# Patient Record
Sex: MALE | Race: BLACK OR AFRICAN AMERICAN | Employment: FULL TIME | URBAN - METROPOLITAN AREA
[De-identification: names, ages, dates, MRNs, and addresses within clinical notes are randomized per-mention and may not be internally consistent; named-entity substitution may affect disease eponyms.]

---

## 2021-02-22 ENCOUNTER — OFFICE VISIT (OUTPATIENT)
Dept: ORTHOPEDIC SURGERY | Age: 41
End: 2021-02-22
Payer: COMMERCIAL

## 2021-02-22 VITALS
WEIGHT: 188 LBS | HEIGHT: 71 IN | DIASTOLIC BLOOD PRESSURE: 71 MMHG | BODY MASS INDEX: 26.32 KG/M2 | SYSTOLIC BLOOD PRESSURE: 124 MMHG | TEMPERATURE: 97.1 F | RESPIRATION RATE: 15 BRPM | HEART RATE: 68 BPM

## 2021-02-22 DIAGNOSIS — S83.001A PATELLAR SUBLUXATION, RIGHT, INITIAL ENCOUNTER: Primary | ICD-10-CM

## 2021-02-22 PROCEDURE — 99204 OFFICE O/P NEW MOD 45 MIN: CPT | Performed by: FAMILY MEDICINE

## 2021-02-22 RX ORDER — MELOXICAM 15 MG/1
TABLET ORAL
Qty: 90 TAB | Refills: 0 | Status: SHIPPED | OUTPATIENT
Start: 2021-02-22 | End: 2021-04-19

## 2021-02-22 NOTE — PROGRESS NOTES
AVS reviewed: YES  HEP: Pt declined demo  Resources Provided: YES YouTube Videos  Patient questions/concerns answered: YES Had questions about putting on the brace  Patient verbalized understanding of treatment plan: YES

## 2021-02-22 NOTE — PATIENT INSTRUCTIONS
Patient to start taking medication as prescribed. Patient to start physical therapy . Do home exercise and use patellar brace daily. Return to the office in 5 weeks Search YouTube for my channel: 
 
Dr. Cheryll Osler I will be deploying to [place] with the [employer] from approximately April 9, 2021 through July 20, 2021. I will be available sporadically via My Chart or via relay of phone messages to me via the internet. Please reach out to us if there is anything we can do for you while I am away, and I look forward to seeing you again when I return in the summer. 
 
-Dr. Lc White

## 2021-02-22 NOTE — PROGRESS NOTES
HISTORY OF PRESENT ILLNESS    Jasen Asif 1980 is a 38y.o. year old male comes in today as new patient for: right knee pain    Patients symptoms have been present for 3 weeks. Pain level 10 - Worst pain ever/10 anterior. It has worsened with walking. Patient has tried:  ER visit and XR but no Rx. Heat and ibuprofen minimal benefit. It is described as pain in knee w/o known injury. IMAGING: XR right knee 2/16/21  1.  Joint effusion. 2. Patient has patella ultrasound which can be associated with transient patellar dislocation. If clinical concern, consider MR for further evaluation. History reviewed. No pertinent surgical history. Social History     Socioeconomic History    Marital status: SINGLE     Spouse name: Not on file    Number of children: Not on file    Years of education: Not on file    Highest education level: Not on file   Tobacco Use    Smoking status: Current Some Day Smoker     Packs/day: 0.25    Smokeless tobacco: Never Used   Substance and Sexual Activity    Alcohol use: Yes     Comment: occasionally    Drug use: Never     Types: Marijuana, Heroin   Social History Narrative    ** Merged History Encounter **           Current Outpatient Medications   Medication Sig Dispense Refill    LOSARTAN PO Take  by mouth.  naloxone (Narcan) 4 mg/actuation nasal spray Use 1 spray intranasally, then discard. Repeat with new spray every 2 min as needed for opioid overdose symptoms, alternating nostrils. 2 Each 0     Past Medical History:   Diagnosis Date    Hypertension      History reviewed. No pertinent family history. ROS:  Some swell, no bruise, numb    Objective:  /71   Pulse 68   Temp 97.1 °F (36.2 °C)   Resp 15   Ht 5' 11\" (1.803 m)   Wt 188 lb (85.3 kg)   BMI 26.22 kg/m²   NEURO:  Sensation intact light touch B/L lower extremities. MS:  LE Strength +5/5 bilateral .   right Knee:  2+ Effusion . Lachman negative. Valgus negative. Varus negative. negative joint line tenderness medial, lateral.  Jamia negative. Posterior drawer negative. Noble compression test negative. Patellar apprehension positive. Patellar facet positive tender to palpation medial no crepitance bilateral .  Pes anserine negative TTP bilateral       Assessment/Plan:     ICD-10-CM ICD-9-CM    1. Patellar subluxation, right, initial encounter  S83.001A 836.3 meloxicam (MOBIC) 15 mg tablet      AMB SUPPLY ORDER      REFERRAL TO PHYSICAL THERAPY       Patient (or guardian if minor) verbalizes understanding of evaluation and plan. Will start mobic and begin PT w/ HEP and patellar stabilizing brace. RTC 5 weeks.

## 2021-03-05 ENCOUNTER — HOSPITAL ENCOUNTER (OUTPATIENT)
Dept: PHYSICAL THERAPY | Age: 41
Discharge: HOME | End: 2021-03-05
Payer: COMMERCIAL

## 2021-03-05 PROCEDURE — 97162 PT EVAL MOD COMPLEX 30 MIN: CPT

## 2021-03-05 NOTE — PROGRESS NOTES
PT DAILY TREATMENT NOTE/KNEE EVAL     Patient Name: Ubaldo Cons  Date:3/5/2021  : 1980  [x]  Patient  Verified  Payor: [address] / Plan: [address] / Product Type: Managed Care Medicaid /    In time:2:30P  Out time:3:00P  Total Treatment Time (min): 30  Visit #: 1 of 12    Treatment Area: Pain in right knee [M25.561]  Unspecified subluxation of right patella, initial encounter [S83.001A]    SUBJECTIVE  Pain Level (0-10 scale): 8.5  []constant []intermittent []improving [x]worsening []no change since onset    Any medication changes, allergies to medications, adverse drug reactions, diagnosis change, or new procedure performed?: [x] No    [] Yes (see summary sheet for update)  Subjective functional status/changes:     PLOF: Ind with ADL/IADLs, self care tasks and recreational participation  Limitations to PLOF: Increased Pain, Instability   Mechanism of Injury/ Current Symptoms: 5- 6 years ago knee twisted R LE (physically demonstrates extreme hip IR) upon stepping out of bathtub ;3 weeks ago just felt R leg give out him upon standing. MD prescribed brace for stability. Aggravating factors include ascending/decending stairs, walking extended distanes. Easing factors include wearing MD prescribed brace and \"staying off of it\". Describes symptoms as achy localized to R knee cap region. Denies numbness/tingling in R LE.     PMHx/Surgical Hx: High Blood Pressure  Work Hx: N/A secondary to COVID  Pt Goals: \"increase strength in leg\" \"play basketball again\"      OBJECTIVE/EXAMINATION    30 min [x]Eval                  []Re-Eval           With   [] TE   [] TA   [] neuro   [] other: Patient Education: [x] Review HEP    [] Progressed/Changed HEP based on:   [] positioning   [] body mechanics   [] transfers   [] heat/ice application    [] other:          Gait:  [x] Normal    [] Abnormal    [] Antalgic    [] NWB    Device: w/ brace    Describe:    ROM / Strength  [] Unable to assess         AROM(deg)                  Strength (1-5)    Left Right Left Right   Hip Flexion NT NT 5 4    Extension NT NT 5 4    Abduction NT NT 5 4-    IR/ER NT NT 5 NT   Knee Flexion 130 110 5 4-    Extension 0 0 5 4-   Ankle Plantarflexion NT NT 5 4+    Dorsiflexion NT NT 5 4+        Flexibility: [] Unable to assess at this time  Hamstrings:    (L) Tightness= [] WNL   [x] Min   [] Mod   [] Severe    (R) Tightness= [] WNL   [] Min   [x] Mod   [] Severe  Quadriceps:    (L) Tightness= [] WNL   [x] Min   [] Mod   [] Severe    (R) Tightness= [] WNL   [] Min   [x] Mod   [] Severe  Gastroc:      (L) Tightness= [] WNL   [x] Min   [] Mod   [] Severe    (R) Tightness= [] WNL   [] Min   [x] Mod   [] Severe  Other:    Palpation:   Neg/Pos  Neg/Pos  Neg/Pos   Joint Line Pos Quad tendon Pos Patellar ligament Pos   Patella Pos Fibular head Neg Pes Anserinus Neg   Tibial tubercle Neg Hamstring tendons Pos Infrapatellar fat pad NT     Optional Tests:  Patellar Positioning (Static)   []L [x]R Lateral  Patellar Tracking   []L [x]R Glide (Med)    Patellar Mobility   []L [x]R Hypermobile []L []R Hypomobile         Posterior Drawer [x] Neg    [] Pos   Yemi [x] Neg    [] Pos  Valgus@ 30 Degrees [x] Neg    [] Pos   Patellar Apprehension [] Neg    [x] Pos  Varus@ 30 Degrees [x] Neg    [] Pos     Anterior Drawer [x] Neg    [] Pos     SLS: NT           Pain Level (0-10 scale) post treatment: 8.5    ASSESSMENT/Changes in Function: See POC    Patient will continue to benefit from skilled PT services to modify and progress therapeutic interventions, address functional mobility deficits, address ROM deficits, address strength deficits, analyze and address soft tissue restrictions, analyze and cue movement patterns, analyze and modify body mechanics/ergonomics, assess and modify postural abnormalities and address imbalance/dizziness to attain remaining goals.      [x]  See Plan of Care  []  See progress note/recertification  []  See Discharge Summary         Progress towards goals / Updated goals:  See POC    PLAN  [x]  Upgrade activities as tolerated     [x]  Continue plan of care  []  Update interventions per flow sheet       []  Discharge due to:_  []  Other:_      Rizwana Quiñonez DPT 3/5/2021  1:06 PM

## 2021-03-05 NOTE — PROGRESS NOTES
[facility] [address], [address] [place] [address], [address] [phone] fax      Plan of Care/ Statement of Necessity for Physical Therapy Services    Patient name: Jasen Asif Start of Care: 3/5/2021   Referral source: Raymond Espinosa DO : 1980    Medical Diagnosis: Pain in right knee [M25.561]  Unspecified subluxation of right patella, initial encounter [S83.001A]  Payor: [address] [place], [place] [address] / Plan: [address] / Product Type: Managed Care Medicaid /  Onset Date: 3 wks ago    Treatment Diagnosis: Pain in Right Knee   Prior Hospitalization: see medical history Provider#: [id number]   Medications: Verified on Patient summary List    Comorbidities: High Blood Pressure    Prior Level of Function: Ind with ADL/IADLs, self care tasks, recreational participation and vocational responsibilities       The Plan of Care and following information is based on the information from the initial evaluation. Assessment/ key information:     Patient presents to clinic secondary to R knee pain following subluxation of R patella 3 wks ago. Clinical examination demonstrates soft tissue restrictions, joint hypermobility, decreased function (evident by FOTO score), decreased strength, AROM, stability, flexibility, and overall mobility limitations/compensatory movement patterns. These limitations affect the patient's ability to stand extended durations, ambulate extended distances, perform ADLs/IADLs, self care tasks and recreational participation efficiently and pain free. The patient would benefit from skilled physical therapy interventions to address the aforementioned impairments in order to return to his PLOF of completing all functional activities pain free and independently.      Evaluation Complexity History MEDIUM  Complexity : 1-2 comorbidities / personal factors will impact the outcome/ POC ; Examination MEDIUM Complexity : 3 Standardized tests and measures addressing body structure, function, activity limitation and / or participation in recreation  ;Presentation MEDIUM Complexity : Evolving with changing characteristics  ; Clinical Decision Making MEDIUM Complexity : FOTO score of 26-74  Overall Complexity Rating: MEDIUM  Problem List: pain affecting function, decrease ROM, decrease strength, edema affecting function, impaired gait/ balance, decrease ADL/ functional abilitiies, decrease activity tolerance, decrease flexibility/ joint mobility and decrease transfer abilities   Treatment Plan may include any combination of the following: Therapeutic exercise, Therapeutic activities, Neuromuscular re-education, Physical agent/modality, Gait/balance training, Manual therapy, Patient education, Self Care training, Functional mobility training, Home safety training and Stair training  Patient / Family readiness to learn indicated by: asking questions, trying to perform skills and interest  Persons(s) to be included in education: patient (P)  Barriers to Learning/Limitations: None  Patient Goal (s): \"increase strength in leg\" \"play basketball again\"  Patient Self Reported Health Status: good  Rehabilitation Potential: good    Short Term Goals: To be accomplished in 2 visits:   1. Patient will become proficient in their HEP and will be compliant in performing that program.   Evaluation: HEP established     Long Term Goals: To be accomplished in 6 weeks:   1. Patient's pain level will be 0-2/10 with activity in order to improve patient's ability to perform normal ADLs. Evaluation: 5-10/10     2. Patient will demonstrate pain free R knee flexion AROM >/= 120 degrees to increase ease of ADLs. Evaluation: R Knee Flex: 110 deg    3. Patient will increase FOTO score to 54 to indicate increased functional mobility. Evaluation: 10     4. Patient will demonstrate R Knee SLS on level surface >/= 30 sec.  (w/o brace) in order to decrease fall risk   Evaluation: NT    5. Patient will increase Right Knee Ext MMT >/= 5/5 in order to return to functional activities pain free   Evaluation: Right Knee Ext: 4-    Frequency / Duration: Patient to be seen 2 times per week for 6 weeks.    Patient/ Caregiver education and instruction: Diagnosis, prognosis, self care, activity modification and exercises   [x]  Plan of care has been reviewed with ANLGE Desouza DPT 3/5/2021 1:04 PM    ________________________________________________________________________    I certify that the above Therapy Services are being furnished while the patient is under my care. I agree with the treatment plan and certify that this therapy is necessary.    Physician's Signature:____________Date:_________TIME:________     Donn Espino, DO  ** Signature, Date and Time must be completed for valid certification **    Please sign and return to [facility] - [address], [address] [address] [phone] fax

## 2021-03-22 ENCOUNTER — OFFICE VISIT (OUTPATIENT)
Dept: ORTHOPEDIC SURGERY | Age: 41
End: 2021-03-22
Payer: COMMERCIAL

## 2021-03-22 VITALS
WEIGHT: 189 LBS | HEART RATE: 83 BPM | RESPIRATION RATE: 15 BRPM | HEIGHT: 71 IN | TEMPERATURE: 97.7 F | BODY MASS INDEX: 26.46 KG/M2

## 2021-03-22 DIAGNOSIS — S83.001D PATELLAR SUBLUXATION, RIGHT, SUBSEQUENT ENCOUNTER: Primary | ICD-10-CM

## 2021-03-22 PROCEDURE — 99213 OFFICE O/P EST LOW 20 MIN: CPT | Performed by: FAMILY MEDICINE

## 2021-03-22 NOTE — PROGRESS NOTES
AVS reviewed: YES  HEP: AT reviewed  Resources Provided: YES Both Videos & Photos  Patient questions/concerns answered: NO pt had no questions or concerns   Patient verbalized understanding of treatment plan: YES

## 2021-03-22 NOTE — PROGRESS NOTES
HISTORY OF PRESENT ILLNESS    Vick Lorenzo 1980 is a 38y.o. year old male comes in today to be evaluated and treated for: right knee pain    Since last appt has noticed minimal improvement as brace too low and only 1 session PT. Pain level 9/10. Using mobic with benefit. IMAGING: XR right knee 2/16/21  1.  Joint effusion. 2. Patient has patella ultrasound which can be associated with transient patellar dislocation. If clinical concern, consider MR for further evaluation. History reviewed. No pertinent surgical history. Social History     Socioeconomic History    Marital status: SINGLE     Spouse name: Not on file    Number of children: Not on file    Years of education: Not on file    Highest education level: Not on file   Tobacco Use    Smoking status: Current Some Day Smoker     Packs/day: 0.25    Smokeless tobacco: Never Used   Substance and Sexual Activity    Alcohol use: Yes     Comment: occasionally    Drug use: Never     Types: Marijuana, Heroin   Social History Narrative    ** Merged History Encounter **          Current Outpatient Medications   Medication Sig Dispense Refill    LOSARTAN PO Take  by mouth.  meloxicam (MOBIC) 15 mg tablet Take 1 tab daily as needed pain with food. 90 Tab 0    naloxone (Narcan) 4 mg/actuation nasal spray Use 1 spray intranasally, then discard. Repeat with new spray every 2 min as needed for opioid overdose symptoms, alternating nostrils. 2 Each 0     Past Medical History:   Diagnosis Date    Hypertension      History reviewed. No pertinent family history. ROS:  + swell with walking    Objective:  Pulse 83   Temp 97.7 °F (36.5 °C)   Resp 15   Ht 5' 11\" (1.803 m)   Wt 189 lb (85.7 kg)   BMI 26.36 kg/m²   NEURO:  Sensation intact light touch B/L lower extremities. MS:  LE Strength +5/5 bilateral .   right Knee:  2+ Effusion . Lachman negative. Valgus negative. Varus negative.   negative joint line tenderness medial, lateral. Jamia negative. Posterior drawer negative. Noble compression test negative. Patellar apprehension positive. Patellar facet positive tender to palpation medial no crepitance bilateral .  Pes anserine negative TTP bilateral.    Assessment/Plan:     ICD-10-CM ICD-9-CM    1. Patellar subluxation, right, subsequent encounter  S83.001D V54.89      836.3        Patient (or guardian if minor) verbalizes understanding of evaluation and plan. Will get in to PT this month and use mobic PRN. Adjusted brace to corrrect inferior positioning. RTC as needed.

## 2021-03-22 NOTE — PATIENT INSTRUCTIONS
Continue with Physical therapy. Take medication as needed. Keep wearing the brace daily. Search YouTube for my channel: 
 
Dr. Harley Carlson

## 2021-03-29 ENCOUNTER — HOSPITAL ENCOUNTER (OUTPATIENT)
Dept: PHYSICAL THERAPY | Age: 41
Discharge: HOME | End: 2021-03-29
Payer: COMMERCIAL

## 2021-03-29 PROCEDURE — 97112 NEUROMUSCULAR REEDUCATION: CPT | Performed by: PHYSICAL THERAPIST

## 2021-03-29 PROCEDURE — 97110 THERAPEUTIC EXERCISES: CPT | Performed by: PHYSICAL THERAPIST

## 2021-03-29 PROCEDURE — 97530 THERAPEUTIC ACTIVITIES: CPT | Performed by: PHYSICAL THERAPIST

## 2021-03-29 NOTE — PROGRESS NOTES
PT DAILY TREATMENT NOTE     Patient Name: Ramu Gutierrez  Date:3/29/2021  : 1980  [x]  Patient  Verified  Payor: [address] / Plan: [address] / Product Type: Managed Care Medicaid /    In time:2:15  Out time:3:05P  Total Treatment Time (min): 50min  Visit #: 2 of 12    Treatment Area: Pain in right knee [M25.561]  Unspecified subluxation of right patella, initial encounter [S83.001A]    SUBJECTIVE  Pain Level (0-10 scale): 5-610  Any medication changes, allergies to medications, adverse drug reactions, diagnosis change, or new procedure performed?: [x] No    [] Yes (see summary sheet for update)  Subjective functional status/changes:   [] No changes reported  Patient states he is concerned about his gap in care - educated on the delay due to insurance authorization required. OBJECTIVE  17 min Therapeutic Exercise:  [x]? ?? See flow sheet :   Rationale: increase ROM and increase strength to improve the patients ability to A/ROM and decrease pain with movement.     12 min Therapeutic Activity:  [x]? ??  See flow sheet :   Rationale: increase strength and improve coordination  to improve the patients ability to Tolerate basic ADLs and job-related tasks without pain. also included goal review and patient education with use of Brace - how to wear properly.      18 min Neuromuscular Re-education:  [x]? ??  See flow sheet :   Rationale: improve coordination, improve balance and increase proprioception  to improve the patients ability to perform activities with good form and proprioception with tactile and verbal cuing appropriately, especially during quad sets.     With   [x]? ?? TE   [x]? ?? TA   [x]? ?? neuro   []? ?? other: Patient Education: [x]??? Review HEP    [x]? ?? Progressed/Changed HEP based on:   [x]? ?? positioning   []? ?? body mechanics   []? ?? transfers   []? ?? heat/ice application    []? ?? other:       Other Objective/Functional Measures: good activation of patella femoral tendons bilaterally     Pain Level (0-10 scale) post treatment: 4/10    ASSESSMENT/Changes in Function: Patient is progressing towards goals of decreased pain and increased activity tolerance. No increased symptoms following today's session. Patient will continue to benefit from skilled PT services to modify and progress therapeutic interventions, address functional mobility deficits, address ROM deficits, address strength deficits, analyze and address soft tissue restrictions, analyze and cue movement patterns, analyze and modify body mechanics/ergonomics, assess and modify postural abnormalities, address imbalance/dizziness and instruct in home and community integration to attain remaining goals. [x]  See Plan of Care  []  See progress note/recertification  []  See Discharge Summary         Progress towards goals / Updated goals:  Short Term Goals: To be accomplished in 2 visits:   1. Patient will become proficient in their HEP and will be compliant in performing that program.   Evaluation: HEP established   Current; doing some 3/29/21     Long Term Goals: To be accomplished in 6 weeks:   1. Patient's pain level will be 0-2/10 with activity in order to improve patient's ability to perform normal ADLs. Evaluation: 5-10/10    Current: 5-6/10 3/29/21    2. Patient will demonstrate pain free R knee flexion AROM >/= 120 degrees to increase ease of ADLs. Evaluation: R Knee Flex: 110 deg     3. Patient will increase FOTO score to 54 to indicate increased functional mobility. Evaluation: 10      4. Patient will demonstrate R Knee SLS on level surface >/= 30 sec.  (w/o brace) in order to decrease fall risk   Evaluation: NT     5. Patient will increase Right Knee Ext MMT >/= 5/5 in order to return to functional activities pain free   Evaluation: Right Knee Ext: 4-       PLAN  [x]  Upgrade activities as tolerated     []  Continue plan of care  []  Update interventions per flow sheet       [] Discharge due to:_  []  Other:_      Yao Del Castillo, PT 3/29/2021  4:53 PM    Future Appointments   Date Time Provider Luzmaria Wilkinson   3/31/2021  2:15 PM Shereen Schwarz DPT [facility]   4/5/2021  2:15 PM Suzan Betancourt, PTA [facility]   4/7/2021  2:15 PM Maximino Atkinson, PTA [facility]   4/12/2021  2:15 PM Suzan Betancourt, PTA [facility]   4/14/2021  2:15 PM Elliott Summers [facility]   4/19/2021  2:15 PM Suzan Betancourt, PTA [facility]   4/21/2021  2:15 PM Maximino Atkinson, PTA [facility]   4/26/2021  2:15 PM Suzan Betancourt, PTA [facility]   4/28/2021  2:15 PM Maximino Atkinson, PTA [facility]   5/3/2021  2:15 PM Pascual Garcia, PT [facility]   5/5/2021  2:15 PM Cindy Clifton, PTA [facility]

## 2021-03-31 ENCOUNTER — HOSPITAL ENCOUNTER (OUTPATIENT)
Dept: PHYSICAL THERAPY | Age: 41
Discharge: HOME | End: 2021-03-31
Payer: COMMERCIAL

## 2021-03-31 PROCEDURE — 97530 THERAPEUTIC ACTIVITIES: CPT

## 2021-03-31 PROCEDURE — 97112 NEUROMUSCULAR REEDUCATION: CPT

## 2021-03-31 PROCEDURE — 97110 THERAPEUTIC EXERCISES: CPT

## 2021-03-31 NOTE — PROGRESS NOTES
PT DAILY TREATMENT NOTE     Patient Name: Ramu Gutierrez  Date:3/31/2021  : 1980  [x]  Patient  Verified  Payor: Andrew Coreas [address] / Plan: [address] / Product Type: Managed Care Medicaid /    In time:2:20P  Out time: 3:00P  Total Treatment Time (min): 40  Visit #: 3 of 12    Treatment Area: Pain in right knee [M25.561]  Unspecified subluxation of right patella, initial encounter [S83.001A]    SUBJECTIVE  Pain Level (0-10 scale): 6/10  Any medication changes, allergies to medications, adverse drug reactions, diagnosis change, or new procedure performed?: [x] No    [] Yes (see summary sheet for update)  Subjective functional status/changes:   [] No changes reported  Patient states he continues to wear brace \"all the time\"     OBJECTIVE  22 min Therapeutic Exercise:  [x]? ?? See flow sheet :   Rationale: increase ROM and increase strength to improve the patients ability to A/ROM and decrease pain with movement.     10 min Therapeutic Activity:  [x]? ??  See flow sheet :   Rationale: increase strength and improve coordination  to improve the patients ability to Tolerate basic ADLs and job-related tasks without pain. also included goal review and patient education with use of Brace - how to wear properly.      8 min Neuromuscular Re-education:  [x]? ??  See flow sheet :   Rationale: improve coordination, improve balance and increase proprioception  to improve the patients ability to perform activities with good form and proprioception with tactile and verbal cuing appropriately, especially during quad sets.     With   [x]? ?? TE   [x]? ?? TA   [x]? ?? neuro   []? ?? other: Patient Education: [x]??? Review HEP    [x]? ?? Progressed/Changed HEP based on:   [x]? ?? positioning   []? ?? body mechanics   []? ?? transfers   []? ?? heat/ice application    [x]? ?? other: Ed on patho of LE instability and brace use - pt verbally demonstrates knowledge and understanding of this.          Pain Level (0-10 scale) post treatment: 4/10    ASSESSMENT/Changes in Function:   Continued progression of ex program w/ performing activities w/o brace during today's session. Pt tolerates well w/ no increase in symptoms however requires frequent cuing to redirect tasks and perform desired movement patterns. Patient will continue to benefit from skilled PT services to modify and progress therapeutic interventions, address functional mobility deficits, address ROM deficits, address strength deficits, analyze and address soft tissue restrictions, analyze and cue movement patterns, analyze and modify body mechanics/ergonomics, assess and modify postural abnormalities, address imbalance/dizziness and instruct in home and community integration to attain remaining goals. [x]  See Plan of Care  []  See progress note/recertification  []  See Discharge Summary         Progress towards goals / Updated goals:  Short Term Goals: To be accomplished in 2 visits:   1. Patient will become proficient in their HEP and will be compliant in performing that program.   Evaluation: HEP established   Current; doing some 3/29/21     Long Term Goals: To be accomplished in 6 weeks:   1. Patient's pain level will be 0-2/10 with activity in order to improve patient's ability to perform normal ADLs. Evaluation: 5-10/10    Current: 5-6/10 3/29/21    2. Patient will demonstrate pain free R knee flexion AROM >/= 120 degrees to increase ease of ADLs. Evaluation: R Knee Flex: 110 deg     3. Patient will increase FOTO score to 54 to indicate increased functional mobility. Evaluation: 10      4. Patient will demonstrate R Knee SLS on level surface >/= 30 sec.  (w/o brace) in order to decrease fall risk   Evaluation: NT     5. Patient will increase Right Knee Ext MMT >/= 5/5 in order to return to functional activities pain free   Evaluation: Right Knee Ext: 4-       PLAN  [x]  Upgrade activities as tolerated     [x]  Continue plan of care  [] Update interventions per flow sheet       []  Discharge due to:_  []  Other:_      Ancel Sasha, DPT 3/31/2021  4:53 PM    Future Appointments   Date Time Provider Luzmaria Wilkinson   4/5/2021  2:15 PM Hazeline Last, PTA [facility]   4/7/2021  2:15 PM Cindy Clifton, PTA [facility]   4/12/2021  2:15 PM Hazeline Last, PTA [facility]   4/14/2021  2:15 PM Annell Areas [facility]   4/19/2021  2:15 PM Hazeline Last, PTA [facility]   4/21/2021  2:15 PM Mary Ann Barrs, PTA [facility]   4/26/2021  2:15 PM Hazeline Last, PTA [facility]   4/28/2021  2:15 PM Mary Ann Barrs, PTA [facility]   5/3/2021  2:15 PM Monica Merlin, PT [facility]   5/5/2021  2:15 PM Cindy Clifton, PTA [facility]

## 2021-04-05 ENCOUNTER — HOSPITAL ENCOUNTER (OUTPATIENT)
Dept: PHYSICAL THERAPY | Age: 41
Discharge: HOME | End: 2021-04-05
Payer: COMMERCIAL

## 2021-04-05 PROCEDURE — 97110 THERAPEUTIC EXERCISES: CPT

## 2021-04-05 PROCEDURE — 97112 NEUROMUSCULAR REEDUCATION: CPT

## 2021-04-05 PROCEDURE — 97530 THERAPEUTIC ACTIVITIES: CPT

## 2021-04-05 NOTE — PROGRESS NOTES
[facility] [address], [address] [place] [address], [address] [place], [place] [address] [address] [phone] fax    Physician Update  [x] Progress Note  [] Discharge Summary  Patient name: Reid Tomas Start of Care: 2021   Referral source: Akua Johnson DO : 1980   Medical/Treatment Diagnosis: Pain in right knee [M25.561]  Unspecified subluxation of right patella, initial encounter [S83.001A]  Payor: [address] [place] [address] Road / Plan: [address] / Product Type: Managed Care Medicaid /  Onset Date:3 weeks ago     Prior Hospitalization: see medical history Provider#: [id number]   Medications: Verified on Patient Summary List    Comorbidities: High Blood Pressure    Prior Level of Function: Ind with ADL/IADLs, self care tasks, recreational participation and vocational responsibilities    Visits from Start of Care: 4    Missed Visits: 0    Status at Evaluation/Last Progress Note:   1. Patient will become proficient in their HEP and will be compliant in performing that program.   Evaluation: HEP established    Long Term Goals: To be accomplished in 6 weeks:   1. Patient's pain level will be 0-2/10 with activity in order to improve patient's ability to perform normal ADLs. Evaluation: 5-10/10   2. Patient will demonstrate pain free R knee flexion AROM >/= 120 degrees to increase ease of ADLs. Evaluation: R Knee Flex: 110 deg  3. Patient will increase FOTO score to 54 to indicate increased functional mobility. Evaluation: 10   4. Patient will demonstrate R Knee SLS on level surface >/= 30 sec. (w/o brace) in order to decrease fall risk   Evaluation: NT  5. Patient will increase Right Knee Ext MMT >/= 5/5 in order to return to functional activities pain free   Evaluation: Right Knee Ext: 4-    Progress towards Goals: Progress based on 4th visit therefore limited gains made.     Functional Gains: strength in muscles around knee  Functional Deficits: stairs, pain, balance, WB on right LE   % improvement: 40%  Pain   Average: 6-7/10       Best: 6/10     Worst: 10/10  Patient Goal: \"to get my leg at 100%\"    1. Patient will become proficient in their HEP and will be compliant in performing that program.   Current; reports compliance  Long Term Goals: To be accomplished in 6 weeks:   1. Patient's pain level will be 0-2/10 with activity in order to improve patient's ability to perform normal ADLs. Current: 6-7/10    2. Patient will demonstrate pain free R knee flexion AROM >/= 120 degrees to increase ease of ADLs. Current: R Knee Flex: 112 deg  3. Patient will increase FOTO score to 54 to indicate increased functional mobility. Current: 11  4. Patient will demonstrate R Knee SLS on level surface >/= 30 sec. (w/o brace) in order to decrease fall risk   Current: unable  5.  Patient will increase Right Knee Ext MMT >/= 5/5 in order to return to functional activities pain free   Current: 4-/5    Goals: to be achieved in 4 weeks:    Continue with unmet goals above    ASSESSMENT/RECOMMENDATIONS:  [x]Continue therapy per initial plan/protocol at a frequency of  2 x per week for 4 weeks  []Continue therapy with the following recommended changes:_____________________      _____________________________________________________________________  []Discontinue therapy progressing towards or have reached established goals  []Discontinue therapy due to lack of appreciable progress towards goals  []Discontinue therapy due to lack of attendance or compliance  []Await Physician's recommendations/decisions regarding therapy  []Other:________________________________________________________________    Thank you for this referral. Lucinda Castellanos, PTA 4/5/2021 1:54 PM  NOTE TO PHYSICIAN:  [address] AND   FAX TO [facility]: [phone]  If you are unable to process this request in 24 hours please contact our office: [phone]    [x]  I have read the above report and request that my patient continue as recommended. I have read the above report and request that my patient continue therapy with the following changes/special instructions:_____________________________________  I have read the above report and request that my patient be discharged from therapy.     [de-identified] Signature:____________Date:_________TIME:________     Corinne Brook, DO  ** Signature, Date and Time must be completed for valid certification **

## 2021-04-05 NOTE — PROGRESS NOTES
PT DAILY TREATMENT NOTE     Patient Name: Lele Oh  Date:2021  : 1980  [x]  Patient  Verified  Payor: [address] [place] [address] Road / Plan: [address] / Product Type: Managed Care Medicaid /    In time:228  Out time:318  Total Treatment Time (min): 50  Visit #: 4 of 12      Treatment Area: Pain in right knee [M25.561]  Unspecified subluxation of right patella, initial encounter [S83.001A]    SUBJECTIVE  Pain Level (0-10 scale): 6-7  Any medication changes, allergies to medications, adverse drug reactions, diagnosis change, or new procedure performed?: [x] No    [] Yes (see summary sheet for update)  Subjective functional status/changes:   [] No changes reported  Patient reports his pain stays around a 6-7 most of the time.     OBJECTIVE    Modality rationale: decrease inflammation and decrease pain to improve the patients ability to perform ADLs   Min Type Additional Details    [] Estim:  []Unatt       []IFC  []Premod                        []Other:  []w/ice   []w/heat  Position:  Location:    [] Estim: []Att    []TENS instruct  []NMES                    []Other:  []w/US   []w/ice   []w/heat  Position:  Location:    []  Traction: [] Cervical       []Lumbar                       [] Prone          []Supine                       []Intermittent   []Continuous Lbs:  [] before manual  [] after manual    []  Ultrasound: []Continuous   [] Pulsed                           []1MHz   []3MHz Location:  W/cm2:    []  Iontophoresis with dexamethasone         Location: [] Take home patch   [] In clinic   10 [x]  Ice     []  heat  []  Ice massage  []  Laser   []  Anodyne Position: long sit  Location: right knee    []  Laser with stim  []  Other: Position:  Location:    []  Vasopneumatic Device Pressure:       [] lo [] med [] hi   Temperature: [] lo [] med [] hi   [x] Skin assessment post-treatment:  [x]intact []redness- no adverse reaction    []redness - adverse reaction:     15 min Therapeutic Exercise:  [] See flow sheet :   Rationale: increase ROM and increase strength to improve the patients ability to increase activity tolerance    15 min Therapeutic Activity:  []  See flow sheet : FOTO, goals   Rationale: increase ROM, increase strength and improve coordination  to improve the patients ability to perform transfers and negotiate stairs with ease     10 min Neuromuscular Re-education:  []  See flow sheet : quad re-ed, hip stabilization   Rationale: increase strength, improve coordination, improve balance and increase proprioception  to improve the patients ability to tolerate sustained postures          With   [] TE   [] TA   [] neuro   [] other: Patient Education: [x] Review HEP    [] Progressed/Changed HEP based on:   [] positioning   [] body mechanics   [] transfers   [] heat/ice application    [] other:      Other Objective/Functional Measures:assessed goals     Pain Level (0-10 scale) post treatment: 6-7    ASSESSMENT/Changes in Function: see PN    Patient will continue to benefit from skilled PT services to modify and progress therapeutic interventions, address functional mobility deficits, address ROM deficits, address strength deficits, analyze and address soft tissue restrictions, analyze and cue movement patterns, analyze and modify body mechanics/ergonomics, assess and modify postural abnormalities, address imbalance/dizziness and instruct in home and community integration to attain remaining goals. []  See Plan of Care  []  See progress note/recertification  []  See Discharge Summary         Progress towards goals / Updated goals:  1. Patient will become proficient in their HEP and will be compliant in performing that program.   Current; reports compliance     Long Term Goals: To be accomplished in 6 weeks:   1. Patient's pain level will be 0-2/10 with activity in order to improve patient's ability to perform normal ADLs. Current: 6-7/10    2.  Patient will demonstrate pain free R knee flexion AROM >/= 120 degrees to increase ease of ADLs. Current: R Knee Flex: 112 deg  3. Patient will increase FOTO score to 54 to indicate increased functional mobility. Current: 11  4. Patient will demonstrate R Knee SLS on level surface >/= 30 sec. (w/o brace) in order to decrease fall risk   Current: unable  5.  Patient will increase Right Knee Ext MMT >/= 5/5 in order to return to functional activities pain free   Current: 4-/5    PLAN  [x]  Upgrade activities as tolerated     [x]  Continue plan of care  []  Update interventions per flow sheet       []  Discharge due to:_  []  Other:_      Magdiel Ferrara, PTA 4/5/2021  2:56 PM    Future Appointments   Date Time Provider Luzmaria Wilkinson   4/7/2021  2:15 PM Scot Sheriff, PTA [facility]   4/12/2021  2:15 PM Eriberto Luna, PTA [facility]   4/14/2021  2:15 PM Kush Perez [facility]   4/19/2021  2:15 PM Eriberto Luna, PTA [facility]   4/21/2021  2:15 PM Scot Sheriff, PTA [facility]   4/26/2021  2:15 PM Eriberto Luna, PTA [facility]   4/28/2021  2:15 PM Scot Sheriff, PTA [facility]   5/3/2021  2:15 PM Radha Fairbanks, PT [facility]   5/5/2021  2:15 PM Cindy Clifton, PTA [facility]

## 2021-04-07 ENCOUNTER — HOSPITAL ENCOUNTER (OUTPATIENT)
Dept: PHYSICAL THERAPY | Age: 41
Discharge: HOME | End: 2021-04-07
Payer: COMMERCIAL

## 2021-04-07 PROCEDURE — 97112 NEUROMUSCULAR REEDUCATION: CPT

## 2021-04-07 PROCEDURE — 97530 THERAPEUTIC ACTIVITIES: CPT

## 2021-04-07 PROCEDURE — 97110 THERAPEUTIC EXERCISES: CPT

## 2021-04-07 NOTE — PROGRESS NOTES
PT DAILY TREATMENT NOTE     Patient Name: Janis Everett  Date:2021  : 1980  [x]  Patient  Verified  Payor: [address] [place] [address] Road / Plan: [address] / Product Type: Managed Care Medicaid /    In time:3:51P  Out time: 4:33P  Total Treatment Time (min): 42  Visit #: 1 of 8    Treatment Area: Pain in right knee [M25.561]  Unspecified subluxation of right patella, initial encounter [S83.001A]    SUBJECTIVE  Pain Level (0-10 scale): 6/10  Any medication changes, allergies to medications, adverse drug reactions, diagnosis change, or new procedure performed?: [x] No    [] Yes (see summary sheet for update)  Subjective functional status/changes:   [] No changes reported  Patient states he is doing well with no new concerns. OBJECTIVE  15 min Therapeutic Exercise:  [x]? ?? See flow sheet :   Rationale: increase ROM and increase strength to improve the patients ability to A/ROM and decrease pain with movement.     15 min Therapeutic Activity:  [x]? ??  See flow sheet :   Rationale: increase strength and improve coordination  to improve the patients ability to Tolerate basic ADLs and job-related tasks without pain. also included goal review and patient education with use of Brace - how to wear properly.      12 min Neuromuscular Re-education:  [x]? ??  See flow sheet :   Rationale: improve coordination, improve balance and increase proprioception  to improve the patients ability to perform activities with good form and proprioception with tactile and verbal cuing appropriately, especially during quad sets.     With   [x]? ?? TE   [x]? ?? TA   [x]? ?? neuro   []? ?? other: Patient Education: [x]??? Review HEP    [x]? ?? Progressed/Changed HEP based on:   [x]? ?? positioning   []? ?? body mechanics   []? ?? transfers   []? ?? heat/ice application    [x]? ?? other: Ed on distinguishing original pain levels from muscular fatigue and importance of explaining symptom reproduction/reduction throughout session in order to best guide progression of POC - pt verbally demonstrates knowledge and understanding of this.          Pain Level (0-10 scale) post treatment: 6.5/10    ASSESSMENT/Changes in Function:   Continued progression of ex program through increased resistance of activities performed over previous sessions, SL stability and functional activities. Pt originally reports no increase in pain levels, just muscular fatigue with performing new exercises, however reports increased pain levels upon concluding session - further stating \"its a good thing\". Ed provided above. Pt appears to be improving R SL stability evident by objective measure noted below. Patient will continue to benefit from skilled PT services to modify and progress therapeutic interventions, address functional mobility deficits, address ROM deficits, address strength deficits, analyze and address soft tissue restrictions, analyze and cue movement patterns, analyze and modify body mechanics/ergonomics, assess and modify postural abnormalities, address imbalance/dizziness and instruct in home and community integration to attain remaining goals. [x]  See Plan of Care  []  See progress note/recertification  []  See Discharge Summary         Progress towards goals / Updated goals:  Short Term Goals: To be accomplished in 2 visits:   1. Patient will become proficient in their HEP and will be compliant in performing that program.   P/N: Progressing: reports compliance     Long Term Goals: To be accomplished in 6 weeks:   1. Patient's pain level will be 0-2/10 with activity in order to improve patient's ability to perform normal ADLs. P/N: Regressed: 6-7/10      2. Patient will demonstrate pain free R knee flexion AROM >/= 120 degrees to increase ease of ADLs. P:N: Progressing: R Knee Flex: 112 deg     3. Patient will increase FOTO score to 54 to indicate increased functional mobility. P/N: Progressin    4.  Patient will demonstrate R Knee SLS on level surface >/= 30 sec. (w/o brace) in order to decrease fall risk   P/N: Remains: unable  Current: Progressin sec, 21     5.  Patient will increase Right Knee Ext MMT >/= 5/5 in order to return to functional activities pain free   P/N: Remains: Right Knee Ext: 4-       PLAN  [x]  Upgrade activities as tolerated     [x]  Continue plan of care  []  Update interventions per flow sheet       []  Discharge due to:_  []  Other:_      Tiki Perez DPT 2021  4:53 PM    Future Appointments   Date Time Provider Luzmaria Wilkinson   2021  2:15 PM Judy Jeffers, PTA [facility]   2021  2:15 PM Kavon Clas, PTA [facility]   2021  2:15 PM Judy Jeffers, PTA [facility]   2021  2:15 PM Laverle Thomaston, PTA [facility]   2021  2:15 PM Judy Jeffers, PTA [facility]   2021  2:15 PM Laverle Thomaston, PTA [facility]   5/3/2021  2:15 PM Behzad Baez, PT [facility]   2021  2:15 PM Cindy Clifton, [facility]

## 2021-04-16 DIAGNOSIS — S83.001A PATELLAR SUBLUXATION, RIGHT, INITIAL ENCOUNTER: ICD-10-CM

## 2021-04-19 RX ORDER — MELOXICAM 15 MG/1
TABLET ORAL
Qty: 90 TAB | Refills: 0 | Status: SHIPPED | OUTPATIENT
Start: 2021-04-19

## 2021-04-21 ENCOUNTER — APPOINTMENT (OUTPATIENT)
Dept: PHYSICAL THERAPY | Age: 41
End: 2021-04-21
Payer: COMMERCIAL

## 2021-04-26 ENCOUNTER — APPOINTMENT (OUTPATIENT)
Dept: PHYSICAL THERAPY | Age: 41
End: 2021-04-26
Payer: COMMERCIAL

## 2021-04-28 ENCOUNTER — APPOINTMENT (OUTPATIENT)
Dept: PHYSICAL THERAPY | Age: 41
End: 2021-04-28
Payer: COMMERCIAL

## 2021-05-03 ENCOUNTER — APPOINTMENT (OUTPATIENT)
Dept: PHYSICAL THERAPY | Age: 41
End: 2021-05-03

## 2021-05-05 ENCOUNTER — APPOINTMENT (OUTPATIENT)
Dept: PHYSICAL THERAPY | Age: 41
End: 2021-05-05

## 2021-05-24 NOTE — PROGRESS NOTES
[facility] [address], [address] [place] [address], [address]  [phone] fax    Discharge Summary    Patient name: Gregory Mcintyre     Start of Care: 3/5/21  Referral source: Lei Simms DO    : 1980  Medical/Treatment Diagnosis: Pain in right knee [M25.561]  Unspecified subluxation of right patella, initial encounter [S83.001A]  Payor: [facility] [address] / Plan: [address] / Product Type: Managed Care Medicaid /        Onset Date:3 weeks  Prior Hospitalization: see medical history   Provider#: [id number]  Comorbidities: High Blood Pressure    Prior Level of Function: Ind with ADL/IADLs, self care tasks, recreational participation and vocational responsibilities  Medications: Verified on Patient Summary List    Visits from Start of Care: 5    Missed Visits: 3  Reporting Period : 21 to 21      Summary of Care:patient seen for 5 skilled sessions. He had missed visits and transportation issues. He is being discharged at this time and should follow up with his MD as needed.  Thank you ,   Goal:Patient will become proficient in their HEP and will be compliant in performing that program  Status at last note/certification:last MD note, reports compliance  Status at discharge: not met, ongoing and not reassessed    Goal:Patient's pain level will be 0-2/10 with activity in order to improve patient's ability to perform normal ADLs  Status at last note/certification:last MD note regressed 6-7/10   Status at discharge: not met, 6    Goal:Patient will demonstrate pain free R knee flexion AROM >/= 120 degrees to increase ease of ADLs  Status at last note/certification:last MD note, Progressing: R Knee Flex: 112 deg  Status at discharge: not met, progressing and not reassessed    Goal:Patient will increase FOTO score to 54 to indicate increased functional mobility  Status at last note/certification:last MD note, 11  Status at discharge: not met, ongoing and Not reassessed      ASSESSMENT/RECOMMENDATIONS:  []Discontinue therapy progressing towards or have reached established goals  []Discontinue therapy due to lack of appreciable progress towards goals  []Discontinue therapy due to lack of attendance or compliance  [x]Other:self discharged due to transportation issues.   Thank you for this referral.     Ricardo Campuzano, PT 5/24/2021 2:32 PM

## 2023-05-05 ENCOUNTER — EMERGENCY VISIT (OUTPATIENT)
Dept: URBAN - METROPOLITAN AREA CLINIC 1 | Facility: CLINIC | Age: 43
End: 2023-05-05

## 2023-05-05 DIAGNOSIS — D31.01: ICD-10-CM

## 2023-05-05 PROCEDURE — 92012 INTRM OPH EXAM EST PATIENT: CPT

## 2023-05-05 ASSESSMENT — TONOMETRY
OD_IOP_MMHG: 14
OS_IOP_MMHG: 14

## 2023-05-05 ASSESSMENT — VISUAL ACUITY
OD_SC: 20/20
OS_PH: 20/25
OS_SC: 20/30-1